# Patient Record
Sex: FEMALE | Race: WHITE | NOT HISPANIC OR LATINO
[De-identification: names, ages, dates, MRNs, and addresses within clinical notes are randomized per-mention and may not be internally consistent; named-entity substitution may affect disease eponyms.]

---

## 2020-01-30 ENCOUNTER — APPOINTMENT (OUTPATIENT)
Dept: ANTEPARTUM | Facility: CLINIC | Age: 30
End: 2020-01-30
Payer: COMMERCIAL

## 2020-01-30 PROCEDURE — 76813 OB US NUCHAL MEAS 1 GEST: CPT

## 2020-01-30 PROCEDURE — 76801 OB US < 14 WKS SINGLE FETUS: CPT

## 2020-03-19 ENCOUNTER — APPOINTMENT (OUTPATIENT)
Dept: ANTEPARTUM | Facility: CLINIC | Age: 30
End: 2020-03-19
Payer: COMMERCIAL

## 2020-03-19 PROCEDURE — 76811 OB US DETAILED SNGL FETUS: CPT

## 2020-03-19 PROCEDURE — 76817 TRANSVAGINAL US OBSTETRIC: CPT

## 2020-07-31 ENCOUNTER — APPOINTMENT (OUTPATIENT)
Dept: ANTEPARTUM | Facility: CLINIC | Age: 30
End: 2020-07-31
Payer: COMMERCIAL

## 2020-07-31 ENCOUNTER — ASOB RESULT (OUTPATIENT)
Age: 30
End: 2020-07-31

## 2020-07-31 ENCOUNTER — APPOINTMENT (OUTPATIENT)
Dept: ANTEPARTUM | Facility: HOSPITAL | Age: 30
End: 2020-07-31

## 2020-07-31 PROBLEM — Z00.00 ENCOUNTER FOR PREVENTIVE HEALTH EXAMINATION: Status: ACTIVE | Noted: 2020-07-31

## 2020-07-31 PROCEDURE — 76819 FETAL BIOPHYS PROFIL W/O NST: CPT

## 2020-07-31 PROCEDURE — 76811 OB US DETAILED SNGL FETUS: CPT

## 2020-07-31 PROCEDURE — 99241 OFFICE CONSULTATION NEW/ESTAB PATIENT 15 MIN: CPT | Mod: 25

## 2020-08-06 ENCOUNTER — OUTPATIENT (OUTPATIENT)
Dept: OUTPATIENT SERVICES | Facility: HOSPITAL | Age: 30
LOS: 1 days | End: 2020-08-06

## 2020-08-06 VITALS
TEMPERATURE: 98 F | OXYGEN SATURATION: 99 % | RESPIRATION RATE: 17 BRPM | WEIGHT: 166.01 LBS | SYSTOLIC BLOOD PRESSURE: 128 MMHG | HEIGHT: 60 IN | DIASTOLIC BLOOD PRESSURE: 86 MMHG | HEART RATE: 90 BPM

## 2020-08-06 DIAGNOSIS — Z01.818 ENCOUNTER FOR OTHER PREPROCEDURAL EXAMINATION: ICD-10-CM

## 2020-08-06 DIAGNOSIS — Z98.890 OTHER SPECIFIED POSTPROCEDURAL STATES: Chronic | ICD-10-CM

## 2020-08-06 LAB
APPEARANCE UR: CLEAR — SIGNIFICANT CHANGE UP
BACTERIA # UR AUTO: HIGH
BILIRUB UR-MCNC: NEGATIVE — SIGNIFICANT CHANGE UP
BLD GP AB SCN SERPL QL: NEGATIVE — SIGNIFICANT CHANGE UP
BLOOD UR QL VISUAL: NEGATIVE — SIGNIFICANT CHANGE UP
COLOR SPEC: SIGNIFICANT CHANGE UP
GLUCOSE UR-MCNC: NEGATIVE — SIGNIFICANT CHANGE UP
HCT VFR BLD CALC: 36.8 % — SIGNIFICANT CHANGE UP (ref 34.5–45)
HGB BLD-MCNC: 12.2 G/DL — SIGNIFICANT CHANGE UP (ref 11.5–15.5)
KETONES UR-MCNC: NEGATIVE — SIGNIFICANT CHANGE UP
LEUKOCYTE ESTERASE UR-ACNC: SIGNIFICANT CHANGE UP
MCHC RBC-ENTMCNC: 29.3 PG — SIGNIFICANT CHANGE UP (ref 27–34)
MCHC RBC-ENTMCNC: 33.2 % — SIGNIFICANT CHANGE UP (ref 32–36)
MCV RBC AUTO: 88.5 FL — SIGNIFICANT CHANGE UP (ref 80–100)
NITRITE UR-MCNC: NEGATIVE — SIGNIFICANT CHANGE UP
NRBC # FLD: 0 K/UL — SIGNIFICANT CHANGE UP (ref 0–0)
PH UR: 6.5 — SIGNIFICANT CHANGE UP (ref 5–8)
PLATELET # BLD AUTO: 218 K/UL — SIGNIFICANT CHANGE UP (ref 150–400)
PMV BLD: 10.5 FL — SIGNIFICANT CHANGE UP (ref 7–13)
PROT UR-MCNC: NEGATIVE — SIGNIFICANT CHANGE UP
RBC # BLD: 4.16 M/UL — SIGNIFICANT CHANGE UP (ref 3.8–5.2)
RBC # FLD: 13.5 % — SIGNIFICANT CHANGE UP (ref 10.3–14.5)
RBC CASTS # UR COMP ASSIST: SIGNIFICANT CHANGE UP (ref 0–?)
RH IG SCN BLD-IMP: POSITIVE — SIGNIFICANT CHANGE UP
SP GR SPEC: 1.01 — SIGNIFICANT CHANGE UP (ref 1–1.04)
SQUAMOUS # UR AUTO: SIGNIFICANT CHANGE UP
UROBILINOGEN FLD QL: NORMAL — SIGNIFICANT CHANGE UP
WBC # BLD: 9.5 K/UL — SIGNIFICANT CHANGE UP (ref 3.8–10.5)
WBC # FLD AUTO: 9.5 K/UL — SIGNIFICANT CHANGE UP (ref 3.8–10.5)
WBC UR QL: HIGH (ref 0–?)

## 2020-08-06 RX ORDER — SODIUM CHLORIDE 9 MG/ML
1000 INJECTION, SOLUTION INTRAVENOUS
Refills: 0 | Status: DISCONTINUED | OUTPATIENT
Start: 2020-08-10 | End: 2020-08-10

## 2020-08-06 NOTE — OB PST NOTE - PROBLEM SELECTOR PLAN 1
Scheduled for primary  section-macrosomia with Dr. Pineda on 08/10/2020.  Preoperative instructions reviewed. Surgical scrub provided. No food after midnight, clear liquids up to 2.5 hours prior to procedure.. Pending labs Type & screen, CBC, UA.   Covid test per GYN

## 2020-08-06 NOTE — OB PST NOTE - HISTORY OF PRESENT ILLNESS
30 yo female with no PMH presents to PST unit with pre-op diagnosis of exceptionally large  baby scheduled for primary  section-macrosomia with Dr. Pineda on 08/10/2020.

## 2020-08-06 NOTE — OB PST NOTE - BP NONINVASIVE DIASTOLIC (MM HG)
What Is The Reason For Today's Visit?: Full Body Skin Examination What Is The Reason For Today's Visit? (Being Monitored For X): concerning skin lesions on an annual basis 86

## 2020-08-07 ENCOUNTER — APPOINTMENT (OUTPATIENT)
Dept: DISASTER EMERGENCY | Facility: CLINIC | Age: 30
End: 2020-08-07

## 2020-08-08 LAB — SARS-COV-2 N GENE NPH QL NAA+PROBE: NOT DETECTED

## 2020-08-09 ENCOUNTER — TRANSCRIPTION ENCOUNTER (OUTPATIENT)
Age: 30
End: 2020-08-09

## 2020-08-10 ENCOUNTER — TRANSCRIPTION ENCOUNTER (OUTPATIENT)
Age: 30
End: 2020-08-10

## 2020-08-10 ENCOUNTER — INPATIENT (INPATIENT)
Facility: HOSPITAL | Age: 30
LOS: 1 days | Discharge: HOME | End: 2020-08-12
Attending: STUDENT IN AN ORGANIZED HEALTH CARE EDUCATION/TRAINING PROGRAM | Admitting: STUDENT IN AN ORGANIZED HEALTH CARE EDUCATION/TRAINING PROGRAM
Payer: COMMERCIAL

## 2020-08-10 VITALS — TEMPERATURE: 98 F

## 2020-08-10 DIAGNOSIS — Z98.890 OTHER SPECIFIED POSTPROCEDURAL STATES: Chronic | ICD-10-CM

## 2020-08-10 LAB
BLD GP AB SCN SERPL QL: NEGATIVE — SIGNIFICANT CHANGE UP
HCT VFR BLD CALC: 36.1 % — SIGNIFICANT CHANGE UP (ref 34.5–45)
HGB BLD-MCNC: 12.4 G/DL — SIGNIFICANT CHANGE UP (ref 11.5–15.5)
MCHC RBC-ENTMCNC: 29.2 PG — SIGNIFICANT CHANGE UP (ref 27–34)
MCHC RBC-ENTMCNC: 34.3 % — SIGNIFICANT CHANGE UP (ref 32–36)
MCV RBC AUTO: 85.1 FL — SIGNIFICANT CHANGE UP (ref 80–100)
NRBC # FLD: 0 K/UL — SIGNIFICANT CHANGE UP (ref 0–0)
PLATELET # BLD AUTO: 190 K/UL — SIGNIFICANT CHANGE UP (ref 150–400)
PMV BLD: 10.1 FL — SIGNIFICANT CHANGE UP (ref 7–13)
RBC # BLD: 4.24 M/UL — SIGNIFICANT CHANGE UP (ref 3.8–5.2)
RBC # FLD: 13.6 % — SIGNIFICANT CHANGE UP (ref 10.3–14.5)
RH IG SCN BLD-IMP: POSITIVE — SIGNIFICANT CHANGE UP
T PALLIDUM AB TITR SER: NEGATIVE — SIGNIFICANT CHANGE UP
WBC # BLD: 9.43 K/UL — SIGNIFICANT CHANGE UP (ref 3.8–10.5)
WBC # FLD AUTO: 9.43 K/UL — SIGNIFICANT CHANGE UP (ref 3.8–10.5)

## 2020-08-10 PROCEDURE — 59025 FETAL NON-STRESS TEST: CPT | Mod: 26

## 2020-08-10 PROCEDURE — 59514 CESAREAN DELIVERY ONLY: CPT | Mod: AS

## 2020-08-10 RX ORDER — ACETAMINOPHEN 500 MG
975 TABLET ORAL
Refills: 0 | Status: DISCONTINUED | OUTPATIENT
Start: 2020-08-10 | End: 2020-08-12

## 2020-08-10 RX ORDER — LANOLIN
1 OINTMENT (GRAM) TOPICAL EVERY 6 HOURS
Refills: 0 | Status: DISCONTINUED | OUTPATIENT
Start: 2020-08-10 | End: 2020-08-12

## 2020-08-10 RX ORDER — MAGNESIUM HYDROXIDE 400 MG/1
30 TABLET, CHEWABLE ORAL
Refills: 0 | Status: DISCONTINUED | OUTPATIENT
Start: 2020-08-10 | End: 2020-08-12

## 2020-08-10 RX ORDER — ONDANSETRON 8 MG/1
4 TABLET, FILM COATED ORAL EVERY 6 HOURS
Refills: 0 | Status: DISCONTINUED | OUTPATIENT
Start: 2020-08-10 | End: 2020-08-11

## 2020-08-10 RX ORDER — SODIUM CHLORIDE 9 MG/ML
1000 INJECTION, SOLUTION INTRAVENOUS
Refills: 0 | Status: DISCONTINUED | OUTPATIENT
Start: 2020-08-10 | End: 2020-08-11

## 2020-08-10 RX ORDER — OXYTOCIN 10 UNIT/ML
333.33 VIAL (ML) INJECTION
Qty: 20 | Refills: 0 | Status: DISCONTINUED | OUTPATIENT
Start: 2020-08-10 | End: 2020-08-11

## 2020-08-10 RX ORDER — OXYCODONE HYDROCHLORIDE 5 MG/1
5 TABLET ORAL
Refills: 0 | Status: DISCONTINUED | OUTPATIENT
Start: 2020-08-10 | End: 2020-08-11

## 2020-08-10 RX ORDER — SODIUM CHLORIDE 9 MG/ML
1000 INJECTION, SOLUTION INTRAVENOUS ONCE
Refills: 0 | Status: COMPLETED | OUTPATIENT
Start: 2020-08-10 | End: 2020-08-10

## 2020-08-10 RX ORDER — OXYCODONE HYDROCHLORIDE 5 MG/1
5 TABLET ORAL ONCE
Refills: 0 | Status: DISCONTINUED | OUTPATIENT
Start: 2020-08-10 | End: 2020-08-12

## 2020-08-10 RX ORDER — TETANUS TOXOID, REDUCED DIPHTHERIA TOXOID AND ACELLULAR PERTUSSIS VACCINE, ADSORBED 5; 2.5; 8; 8; 2.5 [IU]/.5ML; [IU]/.5ML; UG/.5ML; UG/.5ML; UG/.5ML
0.5 SUSPENSION INTRAMUSCULAR ONCE
Refills: 0 | Status: DISCONTINUED | OUTPATIENT
Start: 2020-08-10 | End: 2020-08-12

## 2020-08-10 RX ORDER — HYDROMORPHONE HYDROCHLORIDE 2 MG/ML
1 INJECTION INTRAMUSCULAR; INTRAVENOUS; SUBCUTANEOUS
Refills: 0 | Status: DISCONTINUED | OUTPATIENT
Start: 2020-08-10 | End: 2020-08-11

## 2020-08-10 RX ORDER — FAMOTIDINE 10 MG/ML
20 INJECTION INTRAVENOUS ONCE
Refills: 0 | Status: COMPLETED | OUTPATIENT
Start: 2020-08-10 | End: 2020-08-10

## 2020-08-10 RX ORDER — SIMETHICONE 80 MG/1
80 TABLET, CHEWABLE ORAL EVERY 4 HOURS
Refills: 0 | Status: DISCONTINUED | OUTPATIENT
Start: 2020-08-10 | End: 2020-08-12

## 2020-08-10 RX ORDER — HEPARIN SODIUM 5000 [USP'U]/ML
5000 INJECTION INTRAVENOUS; SUBCUTANEOUS EVERY 12 HOURS
Refills: 0 | Status: DISCONTINUED | OUTPATIENT
Start: 2020-08-10 | End: 2020-08-12

## 2020-08-10 RX ORDER — OXYCODONE HYDROCHLORIDE 5 MG/1
10 TABLET ORAL
Refills: 0 | Status: DISCONTINUED | OUTPATIENT
Start: 2020-08-10 | End: 2020-08-11

## 2020-08-10 RX ORDER — KETOROLAC TROMETHAMINE 30 MG/ML
30 SYRINGE (ML) INJECTION EVERY 6 HOURS
Refills: 0 | Status: DISCONTINUED | OUTPATIENT
Start: 2020-08-10 | End: 2020-08-11

## 2020-08-10 RX ORDER — IBUPROFEN 200 MG
600 TABLET ORAL EVERY 6 HOURS
Refills: 0 | Status: COMPLETED | OUTPATIENT
Start: 2020-08-10 | End: 2021-07-09

## 2020-08-10 RX ORDER — SODIUM CHLORIDE 9 MG/ML
1000 INJECTION, SOLUTION INTRAVENOUS
Refills: 0 | Status: DISCONTINUED | OUTPATIENT
Start: 2020-08-10 | End: 2020-08-10

## 2020-08-10 RX ORDER — OXYCODONE HYDROCHLORIDE 5 MG/1
5 TABLET ORAL
Refills: 0 | Status: DISCONTINUED | OUTPATIENT
Start: 2020-08-10 | End: 2020-08-12

## 2020-08-10 RX ORDER — NALOXONE HYDROCHLORIDE 4 MG/.1ML
0.1 SPRAY NASAL
Refills: 0 | Status: DISCONTINUED | OUTPATIENT
Start: 2020-08-10 | End: 2020-08-11

## 2020-08-10 RX ORDER — CITRIC ACID/SODIUM CITRATE 300-500 MG
30 SOLUTION, ORAL ORAL ONCE
Refills: 0 | Status: COMPLETED | OUTPATIENT
Start: 2020-08-10 | End: 2020-08-10

## 2020-08-10 RX ORDER — DIPHENHYDRAMINE HCL 50 MG
25 CAPSULE ORAL EVERY 6 HOURS
Refills: 0 | Status: DISCONTINUED | OUTPATIENT
Start: 2020-08-10 | End: 2020-08-12

## 2020-08-10 RX ORDER — METOCLOPRAMIDE HCL 10 MG
10 TABLET ORAL ONCE
Refills: 0 | Status: COMPLETED | OUTPATIENT
Start: 2020-08-10 | End: 2020-08-10

## 2020-08-10 RX ORDER — BUTORPHANOL TARTRATE 2 MG/ML
0.12 INJECTION, SOLUTION INTRAMUSCULAR; INTRAVENOUS EVERY 6 HOURS
Refills: 0 | Status: DISCONTINUED | OUTPATIENT
Start: 2020-08-10 | End: 2020-08-11

## 2020-08-10 RX ADMIN — FAMOTIDINE 20 MILLIGRAM(S): 10 INJECTION INTRAVENOUS at 09:31

## 2020-08-10 RX ADMIN — Medication 30 MILLIGRAM(S): at 12:29

## 2020-08-10 RX ADMIN — SODIUM CHLORIDE 125 MILLILITER(S): 9 INJECTION, SOLUTION INTRAVENOUS at 09:31

## 2020-08-10 RX ADMIN — Medication 30 MILLILITER(S): at 09:31

## 2020-08-10 RX ADMIN — Medication 975 MILLIGRAM(S): at 22:18

## 2020-08-10 RX ADMIN — SODIUM CHLORIDE 125 MILLILITER(S): 9 INJECTION, SOLUTION INTRAVENOUS at 08:45

## 2020-08-10 RX ADMIN — Medication 30 MILLIGRAM(S): at 12:28

## 2020-08-10 RX ADMIN — Medication 10 MILLIGRAM(S): at 09:31

## 2020-08-10 RX ADMIN — SODIUM CHLORIDE 2000 MILLILITER(S): 9 INJECTION, SOLUTION INTRAVENOUS at 08:16

## 2020-08-10 RX ADMIN — HEPARIN SODIUM 5000 UNIT(S): 5000 INJECTION INTRAVENOUS; SUBCUTANEOUS at 17:56

## 2020-08-10 RX ADMIN — SODIUM CHLORIDE 75 MILLILITER(S): 9 INJECTION, SOLUTION INTRAVENOUS at 12:28

## 2020-08-10 RX ADMIN — Medication 1000 MILLIUNIT(S)/MIN: at 12:28

## 2020-08-10 RX ADMIN — Medication 975 MILLIGRAM(S): at 21:18

## 2020-08-10 RX ADMIN — Medication 30 MILLIGRAM(S): at 19:00

## 2020-08-10 RX ADMIN — Medication 30 MILLIGRAM(S): at 17:57

## 2020-08-10 NOTE — BRIEF OPERATIVE NOTE - NSICDXBRIEFPREOP_GEN_ALL_CORE_FT
PRE-OP DIAGNOSIS:  Macrosomia affecting management of mother 10-Aug-2020 12:10:29  Melinda Arrington

## 2020-08-10 NOTE — OB PROVIDER H&P - HISTORY OF PRESENT ILLNESS
Subjective  HPI: 29yoF  gestational age 40+1 presents for scheduled pLTCS 2/2 macrosomia with Dr. Pineda. +FM. -LOF. -CTXs. -VB. Pt denies any other concerns.    – PNC: Denies prenatal issues. GBS previously negative, but now .  EFW 4196g by galina.  – OBHx: None  – GynHx: denies  – PMH: denies  – PSH: denies  – Psych: denies   – Social: denies   – Meds: PNV   – Allergies: NKDA  – Will accept blood transfusions? Yes

## 2020-08-10 NOTE — BRIEF OPERATIVE NOTE - OPERATION/FINDINGS
Viable male infant, apgar 9/9, wgt 8.11 #, 3930gms  delivered @1015  cephalic presentation, clear copious fluid  hysterotomy closed in single layer  otherwise grossly nl uterus, tubes & ovaries    QBL: 455  UOP: 100  IVF:   Dictation Number: [id number]

## 2020-08-10 NOTE — DISCHARGE NOTE OB - CARE PLAN
Principal Discharge DX:	Macrosomia  Goal:	Wound care  Assessment and plan of treatment:	Keep the incision clean and dry  Follow up in the office in 2 weeks for wound incision

## 2020-08-10 NOTE — DISCHARGE NOTE OB - CARE PROVIDER_API CALL
Irasema Hernández)  Obstetrics and Gynecology Obstetrics and Gynocology  [address] [place] [address]  Phone: [phone]  Fax: [phone]  Follow Up Time:

## 2020-08-10 NOTE — DISCHARGE NOTE OB - MEDICATION SUMMARY - MEDICATIONS TO TAKE
I will START or STAY ON the medications listed below when I get home from the hospital:  None I will START or STAY ON the medications listed below when I get home from the hospital:    acetaminophen 325 mg oral tablet  -- 3 tab(s) by mouth every 6 hours   -- Indication: For  delivery delivered    ibuprofen 600 mg oral tablet  -- 1 tab(s) by mouth every 6 hours  -- Indication: For  delivery delivered    Prenatal Multivitamins with Folic Acid 1 mg oral tablet  -- 1 tab(s) by mouth once a day  -- Indication: For  delivery delivered

## 2020-08-10 NOTE — DISCHARGE NOTE OB - PATIENT PORTAL LINK FT
You can access the FollowMyHealth Patient Portal offered by [facility] by registering at the following website: [email or web address]. By joining [employer]’s FollowMyHealth portal, you will also be able to view your health information using other applications (apps) compatible with our system.

## 2020-08-10 NOTE — OB PROVIDER DELIVERY SUMMARY - NSPROVIDERDELIVERYNOTE_OBGYN_ALL_OB_FT
Viable male infant, apgar 9/9, wgt 8.11 #, 3930gms  delivered @1015  cephalic presentation/clear copious fluid  hysterotomy closed in single layer  otherwise grossly nl uterus, tubes & ovaries  QBL:455  UOP: 100  IVF:   Dictation Number: Viable male infant, apgar 9/9, wgt 8.11 #, 3930gms  delivered @1015  cephalic presentation/clear copious fluid  hysterotomy closed in double layer closure  otherwise grossly nl uterus, tubes & ovaries  QBL:455  UOP: 100  IVF:   Dictation Number:

## 2020-08-10 NOTE — OB NEONATOLOGY/PEDIATRICIAN DELIVERY SUMMARY - NSPEDSNEONOTESA_OBGYN_ALL_OB_FT
Called to OR for repeat C section/macrosomia. . Mom is  29yoF  gestational age 40+1 presents for scheduled pLTCS 2/2 macrosomia with Dr. Pineda. +FM. -LOF. -CTXs. -VB. Pt denies any other concerns.   PNL acceptable, GBS neg, no ROM, no labor, mom with neg Ob/Gyn Hx. Full term baby , boy, cried immediately, good muscle tone after birth. Delayed cord clamping done for 30 sec. Baby received under the preheated warmer, head positioned, nose,  cleared, baby was dried and basal resuscitation done. HR, resp rate  wnl, good resp effort. Parents informed. APGAR 9/9. PMD:   , breast ,mom wants hep B vaccine. Transferred to nursery for future care

## 2020-08-10 NOTE — BRIEF OPERATIVE NOTE - NSICDXBRIEFPOSTOP_GEN_ALL_CORE_FT
POST-OP DIAGNOSIS:  Macrosomia 10-Aug-2020 12:11:00  Melinda Arrington  S/P  section 10-Aug-2020 12:10:42  Melinda Arrington

## 2020-08-10 NOTE — OB PROVIDER H&P - ASSESSMENT
Assessment  – No prenatal issues. GBS previously negative, now .    Plan  1. Admit to LND for scheduled pLTCS. Routine Labs. IVF.  2. Fetus: cat 1 tracing. VTX. EFW 4196g by sono. No concerns.  4. Prenatal issues: none  5. GBS previously neg, now   6. Pain: IV pain meds    Patient discussed with attending physician, Dr. Samuel.  Danielle Ramirez MD PGY1

## 2020-08-10 NOTE — OB PROVIDER H&P - NSHPPHYSICALEXAM_GEN_ALL_CORE
Objective  BP:   Vital Signs Last 24 Hrs  T(C): 36.8 (10 Aug 2020 08:19), Max: 36.8 (10 Aug 2020 08:03)  T(F): 98.2 (10 Aug 2020 08:19), Max: 98.24 (10 Aug 2020 08:03)  HR: 91 (10 Aug 2020 08:19) (91 - 91)  BP: 122/83 (10 Aug 2020 08:19) (122/83 - 122/83)  RR: 16 (10 Aug 2020 08:19) (16 - 16)    PE:   CV: RRR  Pulm: breathing comfortably on RA  Abd: gravid, nontender  Extr: moving all extremities with ease  – FHT: baseline 1, mod variability, +accels, -decels  – Barton: qmin  – EFW: 4196g by sono  – Sono: vertex

## 2020-08-10 NOTE — DISCHARGE NOTE OB - MATERIALS PROVIDED
Discharge Medication Information for Patients and Families Pocket Guide/Vaccinations/Bottle Feeding Log/Shaken Baby Prevention Handout/Breastfeeding Mother’s Support Group Information/Guide to Postpartum Care/[facility] Hearing Screen Program/Breastfeeding Guide and Packet/Birth Certificate Instructions/[facility]  Screening Program/  Immunization Record/Breastfeeding Log/Back To Sleep Handout

## 2020-08-11 LAB
BASOPHILS # BLD AUTO: 0.02 K/UL — SIGNIFICANT CHANGE UP (ref 0–0.2)
BASOPHILS NFR BLD AUTO: 0.1 % — SIGNIFICANT CHANGE UP (ref 0–2)
EOSINOPHIL # BLD AUTO: 0.03 K/UL — SIGNIFICANT CHANGE UP (ref 0–0.5)
EOSINOPHIL NFR BLD AUTO: 0.2 % — SIGNIFICANT CHANGE UP (ref 0–6)
HCT VFR BLD CALC: 30.1 % — LOW (ref 34.5–45)
HGB BLD-MCNC: 9.8 G/DL — LOW (ref 11.5–15.5)
IMM GRANULOCYTES NFR BLD AUTO: 0.6 % — SIGNIFICANT CHANGE UP (ref 0–1.5)
LYMPHOCYTES # BLD AUTO: 1.96 K/UL — SIGNIFICANT CHANGE UP (ref 1–3.3)
LYMPHOCYTES # BLD AUTO: 14.2 % — SIGNIFICANT CHANGE UP (ref 13–44)
MCHC RBC-ENTMCNC: 29.1 PG — SIGNIFICANT CHANGE UP (ref 27–34)
MCHC RBC-ENTMCNC: 32.6 % — SIGNIFICANT CHANGE UP (ref 32–36)
MCV RBC AUTO: 89.3 FL — SIGNIFICANT CHANGE UP (ref 80–100)
MONOCYTES # BLD AUTO: 0.89 K/UL — SIGNIFICANT CHANGE UP (ref 0–0.9)
MONOCYTES NFR BLD AUTO: 6.4 % — SIGNIFICANT CHANGE UP (ref 2–14)
NEUTROPHILS # BLD AUTO: 10.83 K/UL — HIGH (ref 1.8–7.4)
NEUTROPHILS NFR BLD AUTO: 78.5 % — HIGH (ref 43–77)
NRBC # FLD: 0 K/UL — SIGNIFICANT CHANGE UP (ref 0–0)
PLATELET # BLD AUTO: 168 K/UL — SIGNIFICANT CHANGE UP (ref 150–400)
PMV BLD: 10.4 FL — SIGNIFICANT CHANGE UP (ref 7–13)
RBC # BLD: 3.37 M/UL — LOW (ref 3.8–5.2)
RBC # FLD: 13.7 % — SIGNIFICANT CHANGE UP (ref 10.3–14.5)
WBC # BLD: 13.81 K/UL — HIGH (ref 3.8–10.5)
WBC # FLD AUTO: 13.81 K/UL — HIGH (ref 3.8–10.5)

## 2020-08-11 RX ORDER — FERROUS SULFATE 325(65) MG
325 TABLET ORAL DAILY
Refills: 0 | Status: DISCONTINUED | OUTPATIENT
Start: 2020-08-11 | End: 2020-08-12

## 2020-08-11 RX ORDER — IBUPROFEN 200 MG
1 TABLET ORAL
Qty: 56 | Refills: 0
Start: 2020-08-11 | End: 2020-08-24

## 2020-08-11 RX ORDER — ACETAMINOPHEN 500 MG
3 TABLET ORAL
Qty: 144 | Refills: 0
Start: 2020-08-11 | End: 2020-08-22

## 2020-08-11 RX ORDER — IBUPROFEN 200 MG
600 TABLET ORAL EVERY 6 HOURS
Refills: 0 | Status: DISCONTINUED | OUTPATIENT
Start: 2020-08-11 | End: 2020-08-12

## 2020-08-11 RX ADMIN — Medication 30 MILLIGRAM(S): at 03:00

## 2020-08-11 RX ADMIN — SIMETHICONE 80 MILLIGRAM(S): 80 TABLET, CHEWABLE ORAL at 12:46

## 2020-08-11 RX ADMIN — SIMETHICONE 80 MILLIGRAM(S): 80 TABLET, CHEWABLE ORAL at 06:28

## 2020-08-11 RX ADMIN — Medication 975 MILLIGRAM(S): at 06:28

## 2020-08-11 RX ADMIN — SIMETHICONE 80 MILLIGRAM(S): 80 TABLET, CHEWABLE ORAL at 02:07

## 2020-08-11 RX ADMIN — Medication 600 MILLIGRAM(S): at 23:31

## 2020-08-11 RX ADMIN — Medication 600 MILLIGRAM(S): at 18:50

## 2020-08-11 RX ADMIN — HEPARIN SODIUM 5000 UNIT(S): 5000 INJECTION INTRAVENOUS; SUBCUTANEOUS at 17:50

## 2020-08-11 RX ADMIN — Medication 30 MILLIGRAM(S): at 02:07

## 2020-08-11 RX ADMIN — HEPARIN SODIUM 5000 UNIT(S): 5000 INJECTION INTRAVENOUS; SUBCUTANEOUS at 06:28

## 2020-08-11 RX ADMIN — Medication 600 MILLIGRAM(S): at 17:50

## 2020-08-11 RX ADMIN — Medication 975 MILLIGRAM(S): at 12:46

## 2020-08-11 RX ADMIN — Medication 975 MILLIGRAM(S): at 07:25

## 2020-08-11 RX ADMIN — Medication 325 MILLIGRAM(S): at 12:46

## 2020-08-11 RX ADMIN — Medication 1 TABLET(S): at 12:46

## 2020-08-11 RX ADMIN — Medication 975 MILLIGRAM(S): at 13:45

## 2020-08-11 NOTE — PROGRESS NOTE ADULT - SUBJECTIVE AND OBJECTIVE BOX
OB Progress Note:  Delivery, POD#1    S: 28yo POD#1 s/p LTCS 2/2 macrosomia c/b low UOP postop. This morning, her UOP has improved and she is voiding independently without difficulty. Her pain is well controlled. She is tolerating a regular diet and passing flatus. Denies N/V. Denies CP/SOB/lightheadedness/dizziness.   She is ambulating without difficulty.     O:   Vital Signs Last 24 Hrs  T(C): 36.7 (11 Aug 2020 05:52), Max: 37 (10 Aug 2020 13:30)  T(F): 98.1 (11 Aug 2020 05:52), Max: 98.6 (10 Aug 2020 13:30)  HR: 86 (11 Aug 2020 05:52) (78 - 106)  BP: 118/72 (11 Aug 2020 05:52) (99/72 - 125/73)  BP(mean): 71 (10 Aug 2020 13:30) (60 - 104)  RR: 18 (11 Aug 2020 05:52) (8 - 22)  SpO2: 97% (11 Aug 2020 05:52) (97% - 100%)    I&O's Detail    10 Aug 2020 07:01  -  11 Aug 2020 07:00  --------------------------------------------------------  IN:    Lactated Ringers IV Bolus: 2100 mL    lactated ringers.: 150 mL    Other: 2000 mL    oxytocin Infusion: 375 mL  Total IN: 4625 mL    OUT:    Estimated Blood Loss: 455 mL    Indwelling Catheter - Urethral: 1580 mL    Other: 100 mL    Voided: 1550 mL  Total OUT: 3685 mL    Total NET: 940 mL      Labs:  Blood type: A Positive  Rubella IgG: RPR: Negative                          9.8<L>   13.81<H> >-----------< 168    (  @ 05:30 )             30.1<L>                        12.4   9.43 >-----------< 190    ( 08-10 @ 08:10 )             36.1      PE:  General: NAD  Abdomen: Mildly distended, appropriately tender, incision c/d/i.  Extremities: No erythema, no pitting edema OB Progress Note:  Delivery, POD#1    S: 28yo POD#1 s/p LTCS 2/2 macrosomia complicated by low UOP postop. This morning, her UOP has improved and she is voiding independently without difficulty. Her pain is well controlled. She is tolerating a regular diet and passing flatus. Denies N/V. Denies CP/SOB/lightheadedness/dizziness.   She is ambulating without difficulty.     O:   Vital Signs Last 24 Hrs  T(C): 36.7 (11 Aug 2020 05:52), Max: 37 (10 Aug 2020 13:30)  T(F): 98.1 (11 Aug 2020 05:52), Max: 98.6 (10 Aug 2020 13:30)  HR: 86 (11 Aug 2020 05:52) (78 - 106)  BP: 118/72 (11 Aug 2020 05:52) (99/72 - 125/73)  BP(mean): 71 (10 Aug 2020 13:30) (60 - 104)  RR: 18 (11 Aug 2020 05:52) (8 - 22)  SpO2: 97% (11 Aug 2020 05:52) (97% - 100%)    I&O's Detail    10 Aug 2020 07:01  -  11 Aug 2020 07:00  --------------------------------------------------------  IN:    Lactated Ringers IV Bolus: 2100 mL    lactated ringers.: 150 mL    Other: 2000 mL    oxytocin Infusion: 375 mL  Total IN: 4625 mL    OUT:    Estimated Blood Loss: 455 mL    Indwelling Catheter - Urethral: 1580 mL    Other: 100 mL    Voided: 1550 mL  Total OUT: 3685 mL    Total NET: 940 mL      Labs:  Blood type: A Positive  Rubella IgG: RPR: Negative                          9.8<L>   13.81<H> >-----------< 168    (  @ 05:30 )             30.1<L>                        12.4   9.43 >-----------< 190    ( 08-10 @ 08:10 )             36.1      PE:  General: NAD  Abdomen: Mildly distended, appropriately tender, incision c/d/i.  Extremities: No erythema, no pitting edema

## 2020-08-11 NOTE — PROGRESS NOTE ADULT - PROBLEM SELECTOR PLAN 1
- UOP now adequate  - Continue regular diet.  - Increase ambulation.  - Continue motrin, tylenol, oxycodone PRN for pain control.   - F/u AM CBC    Danielle Ramirez MD PGY1 - UOP now adequate  - Discontinue schwab  - Continue regular diet.  - Increase ambulation.  - Out of bed with ambulation  - Incentive spirometer  - Continue motrin, tylenol, oxycodone PRN for pain control.   - Labs reviewed     Danielle Ramirez MD PGY1

## 2020-08-12 VITALS
TEMPERATURE: 98 F | DIASTOLIC BLOOD PRESSURE: 76 MMHG | SYSTOLIC BLOOD PRESSURE: 138 MMHG | RESPIRATION RATE: 18 BRPM | HEART RATE: 90 BPM | OXYGEN SATURATION: 99 %

## 2020-08-12 RX ADMIN — Medication 600 MILLIGRAM(S): at 00:30

## 2020-08-12 RX ADMIN — Medication 1 TABLET(S): at 11:55

## 2020-08-12 RX ADMIN — Medication 600 MILLIGRAM(S): at 11:55

## 2020-08-12 RX ADMIN — Medication 325 MILLIGRAM(S): at 11:55

## 2020-08-12 RX ADMIN — Medication 600 MILLIGRAM(S): at 06:21

## 2020-08-12 RX ADMIN — HEPARIN SODIUM 5000 UNIT(S): 5000 INJECTION INTRAVENOUS; SUBCUTANEOUS at 06:21

## 2020-08-12 NOTE — PROGRESS NOTE ADULT - ASSESSMENT
Assessment and Plan  POD #2 s/p C/S.  Doing well and bonding with baby  Encourage ambulation.  Incisional care and PO instructions reviewed.  CPC.  discharge pt home today  RTO 2 weeks for incision check/PRN

## 2020-08-12 NOTE — PROGRESS NOTE ADULT - SUBJECTIVE AND OBJECTIVE BOX
Post-Operative Note, C/S  She is a  29y woman who is now post-operative day: 2    Subjective:  The patient feels well.  She is ambulating.   She is tolerating regular diet.  She denies nausea and vomiting; denies fever.  She is voiding.  Her pain is controlled; incisional pain is appropriate.  She reports normal postpartum bleeding.  She is breastfeeding.    Physical exam:    Vital Signs Last 24 Hrs  T(C): 36.8 (12 Aug 2020 05:41), Max: 36.9 (11 Aug 2020 15:30)  T(F): 98.2 (12 Aug 2020 05:41), Max: 98.5 (11 Aug 2020 15:30)  HR: 81 (12 Aug 2020 05:41) (81 - 97)  BP: 123/73 (12 Aug 2020 05:41) (114/73 - 123/73)  BP(mean): --  RR: 18 (12 Aug 2020 05:41) (16 - 18)  SpO2: 99% (12 Aug 2020 05:41) (99% - 100%)    Gen: NAD  Breast: Soft, nontender, not engorged.  Abdomen: Soft, nontender, no distension , firm uterine fundus at umbilicus.  Incision: C/D/I.  Pelvic: Normal lochia noted  Ext: No calf tenderness    LABS:                        9.8    13.81 )-----------( 168      ( 11 Aug 2020 05:30 )             30.1       Allergies    No Known Allergies        MEDICATIONS  (STANDING):  acetaminophen   Tablet .. 975 milliGRAM(s) Oral <User Schedule>  diphtheria/tetanus/pertussis (acellular) Vaccine (ADAcel) 0.5 milliLiter(s) IntraMuscular once  ferrous    sulfate 325 milliGRAM(s) Oral daily  heparin   Injectable 5000 Unit(s) SubCutaneous every 12 hours  ibuprofen  Tablet. 600 milliGRAM(s) Oral every 6 hours  prenatal multivitamin 1 Tablet(s) Oral daily    MEDICATIONS  (PRN):  diphenhydrAMINE 25 milliGRAM(s) Oral every 6 hours PRN Itching  lanolin Ointment 1 Application(s) Topical every 6 hours PRN Sore Nipples  magnesium hydroxide Suspension 30 milliLiter(s) Oral two times a day PRN Constipation  oxyCODONE    IR 5 milliGRAM(s) Oral every 3 hours PRN Moderate to Severe Pain (4-10)  oxyCODONE    IR 5 milliGRAM(s) Oral once PRN Moderate to Severe Pain (4-10)  simethicone 80 milliGRAM(s) Chew every 4 hours PRN Gas

## 2021-08-01 NOTE — OB PST NOTE - NS SC CAGE ALCOHOL EYE OPENER
no Pt caught 3rd right finger in door last week, was seen in ED and sent home. As per mother, pt's had worsening swelling, drainage from finger, was seen by PCP and given antibiotics 2 days ago, unable to recall name of antibiotics. Pt's mother reports pt had fever starting today, given motrin 1 hour PTA.

## 2021-11-04 NOTE — OB PST NOTE - NSHPPHYSICALEXAM_GEN_ALL_CORE

## 2022-01-08 ENCOUNTER — TRANSCRIPTION ENCOUNTER (OUTPATIENT)
Age: 32
End: 2022-01-08

## 2022-02-25 ENCOUNTER — APPOINTMENT (OUTPATIENT)
Dept: ANTEPARTUM | Facility: CLINIC | Age: 32
End: 2022-02-25
Payer: COMMERCIAL

## 2022-02-25 ENCOUNTER — ASOB RESULT (OUTPATIENT)
Age: 32
End: 2022-02-25

## 2022-02-25 PROCEDURE — 76813 OB US NUCHAL MEAS 1 GEST: CPT

## 2022-02-25 PROCEDURE — 76801 OB US < 14 WKS SINGLE FETUS: CPT

## 2022-04-16 ENCOUNTER — APPOINTMENT (OUTPATIENT)
Dept: ANTEPARTUM | Facility: CLINIC | Age: 32
End: 2022-04-16
Payer: COMMERCIAL

## 2022-04-16 ENCOUNTER — ASOB RESULT (OUTPATIENT)
Age: 32
End: 2022-04-16

## 2022-04-16 PROCEDURE — 76805 OB US >/= 14 WKS SNGL FETUS: CPT

## 2022-08-29 ENCOUNTER — OUTPATIENT (OUTPATIENT)
Dept: OUTPATIENT SERVICES | Facility: HOSPITAL | Age: 32
LOS: 1 days | End: 2022-08-29

## 2022-08-29 VITALS — WEIGHT: 293 LBS | HEIGHT: 60 IN

## 2022-08-29 DIAGNOSIS — Z98.891 HISTORY OF UTERINE SCAR FROM PREVIOUS SURGERY: ICD-10-CM

## 2022-08-29 DIAGNOSIS — Z98.890 OTHER SPECIFIED POSTPROCEDURAL STATES: Chronic | ICD-10-CM

## 2022-08-29 DIAGNOSIS — Z98.891 HISTORY OF UTERINE SCAR FROM PREVIOUS SURGERY: Chronic | ICD-10-CM

## 2022-08-29 LAB
APPEARANCE UR: ABNORMAL
BILIRUB UR-MCNC: NEGATIVE — SIGNIFICANT CHANGE UP
BLD GP AB SCN SERPL QL: NEGATIVE — SIGNIFICANT CHANGE UP
COLOR SPEC: SIGNIFICANT CHANGE UP
DIFF PNL FLD: NEGATIVE — SIGNIFICANT CHANGE UP
GLUCOSE UR QL: NEGATIVE — SIGNIFICANT CHANGE UP
HCT VFR BLD CALC: 36 % — SIGNIFICANT CHANGE UP (ref 34.5–45)
HGB BLD-MCNC: 12.1 G/DL — SIGNIFICANT CHANGE UP (ref 11.5–15.5)
KETONES UR-MCNC: ABNORMAL
LEUKOCYTE ESTERASE UR-ACNC: ABNORMAL
MCHC RBC-ENTMCNC: 29 PG — SIGNIFICANT CHANGE UP (ref 27–34)
MCHC RBC-ENTMCNC: 33.6 GM/DL — SIGNIFICANT CHANGE UP (ref 32–36)
MCV RBC AUTO: 86.3 FL — SIGNIFICANT CHANGE UP (ref 80–100)
NITRITE UR-MCNC: NEGATIVE — SIGNIFICANT CHANGE UP
NRBC # BLD: 0 /100 WBCS — SIGNIFICANT CHANGE UP (ref 0–0)
NRBC # FLD: 0 K/UL — SIGNIFICANT CHANGE UP (ref 0–0)
PH UR: 6.5 — SIGNIFICANT CHANGE UP (ref 5–8)
PLATELET # BLD AUTO: 232 K/UL — SIGNIFICANT CHANGE UP (ref 150–400)
PROT UR-MCNC: ABNORMAL
RBC # BLD: 4.17 M/UL — SIGNIFICANT CHANGE UP (ref 3.8–5.2)
RBC # FLD: 14.6 % — HIGH (ref 10.3–14.5)
RH IG SCN BLD-IMP: POSITIVE — SIGNIFICANT CHANGE UP
SP GR SPEC: 1.01 — SIGNIFICANT CHANGE UP (ref 1.01–1.05)
UROBILINOGEN FLD QL: SIGNIFICANT CHANGE UP
WBC # BLD: 13.06 K/UL — HIGH (ref 3.8–10.5)
WBC # FLD AUTO: 13.06 K/UL — HIGH (ref 3.8–10.5)

## 2022-08-29 RX ORDER — FERROUS SULFATE 325(65) MG
1 TABLET ORAL
Qty: 0 | Refills: 0 | DISCHARGE

## 2022-08-29 NOTE — OB PST NOTE - PROBLEM SELECTOR PLAN 1
32 y/o female  delivered by  section due to large size of baby (BW=9lbs).  Pt now with EDC 22, Scheduled for  Repeat  section.     CBC T&S UA    Preop instructions and antibacterial soap given and explained (verbal and written), with teach back. Preop covid testing protocol reviewed with pt

## 2022-08-29 NOTE — OB PST NOTE - HISTORY OF PRESENT ILLNESS
30 y/o female  delivered by cesaeran section due to large size of baby (BW=9lbs).  Pt now with EDC 22, Scheduled for  Repeat  section.     Pt reporters she feels baby moving today as usual 32 y/o female  delivered by  section due to large size of baby (BW=9lbs).  Pt now with EDC 22, Scheduled for  Repeat  section.     Pt reporters she feels baby moving today as usual

## 2022-08-29 NOTE — OB PST NOTE - ASSESSMENT
32 y/o female  delivered by  section due to large size of baby (BW=9lbs).  Pt now with EDC 22, Scheduled for  Repeat  section.

## 2022-09-09 ENCOUNTER — OUTPATIENT (OUTPATIENT)
Dept: OUTPATIENT SERVICES | Facility: HOSPITAL | Age: 32
LOS: 1 days | Discharge: HOME | End: 2022-09-09

## 2022-09-09 VITALS
DIASTOLIC BLOOD PRESSURE: 73 MMHG | RESPIRATION RATE: 16 BRPM | SYSTOLIC BLOOD PRESSURE: 118 MMHG | TEMPERATURE: 98 F | HEART RATE: 86 BPM

## 2022-09-09 DIAGNOSIS — O26.899 OTHER SPECIFIED PREGNANCY RELATED CONDITIONS, UNSPECIFIED TRIMESTER: ICD-10-CM

## 2022-09-09 DIAGNOSIS — Z98.891 HISTORY OF UTERINE SCAR FROM PREVIOUS SURGERY: Chronic | ICD-10-CM

## 2022-09-09 DIAGNOSIS — Z3A.00 WEEKS OF GESTATION OF PREGNANCY NOT SPECIFIED: ICD-10-CM

## 2022-09-09 DIAGNOSIS — Z98.890 OTHER SPECIFIED POSTPROCEDURAL STATES: Chronic | ICD-10-CM

## 2022-09-09 PROCEDURE — 99214 OFFICE O/P EST MOD 30 MIN: CPT | Mod: 25

## 2022-09-09 PROCEDURE — 76818 FETAL BIOPHYS PROFILE W/NST: CPT | Mod: 26

## 2022-09-09 NOTE — OB PROVIDER TRIAGE NOTE - NSOBPROVIDERNOTE_OBGYN_ALL_OB_FT
a/p: 31 y.o.  UMBERTO 2022 @ 40.1 weeks r/o ROM    no evidence of ROM at this time    BPP 10/10 a/p: 31 y.o.  UMBERTO 2022 @ 40.1 weeks r/o ROM    no evidence of ROM at this time    BPP 10/10    1635  Dr Samuel called re: plan of care      Discussed with Dr Samuel. Plan for discharge home with labor precautions/fetal kick counts reviewed. Encouraged increased PO hydration. F/U next scheduled prenatal appointment.    Shannon, NP

## 2022-09-09 NOTE — OB PROVIDER TRIAGE NOTE - NSHPPHYSICALEXAM_GEN_ALL_CORE
abdomen soft, nontender  taus: sliup, cephalic presentation, posterior placenta, bpp 8/8, julian 8.12  sse: negative pooling/nitrazine/ferning  sve: 0/0/-3/I  nst reactive  toco: no ctx noted

## 2022-09-09 NOTE — OB PROVIDER TRIAGE NOTE - HISTORY OF PRESENT ILLNESS
31 y.o.  UMBERTO 2022 @ 40.1 weeks presents with c/o LOF since 330am and irregular ctx. Denies VB. States +FM and uncomplicated antepartum course. Pt desires TOLAC. Pt is scheduled for repeat cs on 2022.    allergies:  NKDA  medications:  prenatal vitamins    med/surg hx:  cs x 1    OBGYN hx:  8/10/2020 scheduled pLTCS 2/2 suspected fetal macrosomia - 8lbs 12 oz

## 2022-09-11 ENCOUNTER — TRANSCRIPTION ENCOUNTER (OUTPATIENT)
Age: 32
End: 2022-09-11

## 2022-09-12 ENCOUNTER — INPATIENT (INPATIENT)
Facility: HOSPITAL | Age: 32
LOS: 1 days | Discharge: HOME | End: 2022-09-14
Attending: OBSTETRICS & GYNECOLOGY | Admitting: OBSTETRICS & GYNECOLOGY

## 2022-09-12 ENCOUNTER — TRANSCRIPTION ENCOUNTER (OUTPATIENT)
Age: 32
End: 2022-09-12

## 2022-09-12 VITALS
RESPIRATION RATE: 16 BRPM | DIASTOLIC BLOOD PRESSURE: 77 MMHG | TEMPERATURE: 99 F | SYSTOLIC BLOOD PRESSURE: 127 MMHG | HEART RATE: 82 BPM

## 2022-09-12 DIAGNOSIS — Z98.891 HISTORY OF UTERINE SCAR FROM PREVIOUS SURGERY: Chronic | ICD-10-CM

## 2022-09-12 DIAGNOSIS — Z98.890 OTHER SPECIFIED POSTPROCEDURAL STATES: Chronic | ICD-10-CM

## 2022-09-12 DIAGNOSIS — O26.899 OTHER SPECIFIED PREGNANCY RELATED CONDITIONS, UNSPECIFIED TRIMESTER: ICD-10-CM

## 2022-09-12 DIAGNOSIS — Z3A.00 WEEKS OF GESTATION OF PREGNANCY NOT SPECIFIED: ICD-10-CM

## 2022-09-12 LAB
AMNISURE ROM (RUPTURE OF MEMBRANES): POSITIVE
B PERT DNA SPEC QL NAA+PROBE: SIGNIFICANT CHANGE UP
B PERT+PARAPERT DNA PNL SPEC NAA+PROBE: SIGNIFICANT CHANGE UP
BASOPHILS # BLD AUTO: 0.04 K/UL — SIGNIFICANT CHANGE UP (ref 0–0.2)
BASOPHILS NFR BLD AUTO: 0.3 % — SIGNIFICANT CHANGE UP (ref 0–2)
BLD GP AB SCN SERPL QL: NEGATIVE — SIGNIFICANT CHANGE UP
BORDETELLA PARAPERTUSSIS (RAPRVP): SIGNIFICANT CHANGE UP
C PNEUM DNA SPEC QL NAA+PROBE: SIGNIFICANT CHANGE UP
EOSINOPHIL # BLD AUTO: 0.02 K/UL — SIGNIFICANT CHANGE UP (ref 0–0.5)
EOSINOPHIL NFR BLD AUTO: 0.1 % — SIGNIFICANT CHANGE UP (ref 0–6)
FLUAV SUBTYP SPEC NAA+PROBE: SIGNIFICANT CHANGE UP
FLUBV RNA SPEC QL NAA+PROBE: SIGNIFICANT CHANGE UP
HADV DNA SPEC QL NAA+PROBE: SIGNIFICANT CHANGE UP
HCOV 229E RNA SPEC QL NAA+PROBE: SIGNIFICANT CHANGE UP
HCOV HKU1 RNA SPEC QL NAA+PROBE: SIGNIFICANT CHANGE UP
HCOV NL63 RNA SPEC QL NAA+PROBE: SIGNIFICANT CHANGE UP
HCOV OC43 RNA SPEC QL NAA+PROBE: SIGNIFICANT CHANGE UP
HCT VFR BLD CALC: 40.1 % — SIGNIFICANT CHANGE UP (ref 34.5–45)
HGB BLD-MCNC: 13.9 G/DL — SIGNIFICANT CHANGE UP (ref 11.5–15.5)
HMPV RNA SPEC QL NAA+PROBE: SIGNIFICANT CHANGE UP
HPIV1 RNA SPEC QL NAA+PROBE: SIGNIFICANT CHANGE UP
HPIV2 RNA SPEC QL NAA+PROBE: SIGNIFICANT CHANGE UP
HPIV3 RNA SPEC QL NAA+PROBE: SIGNIFICANT CHANGE UP
HPIV4 RNA SPEC QL NAA+PROBE: SIGNIFICANT CHANGE UP
IANC: 10.92 K/UL — HIGH (ref 1.8–7.4)
IMM GRANULOCYTES NFR BLD AUTO: 0.7 % — SIGNIFICANT CHANGE UP (ref 0–1.5)
LYMPHOCYTES # BLD AUTO: 14.7 % — SIGNIFICANT CHANGE UP (ref 13–44)
LYMPHOCYTES # BLD AUTO: 2.02 K/UL — SIGNIFICANT CHANGE UP (ref 1–3.3)
M PNEUMO DNA SPEC QL NAA+PROBE: SIGNIFICANT CHANGE UP
MCHC RBC-ENTMCNC: 29.6 PG — SIGNIFICANT CHANGE UP (ref 27–34)
MCHC RBC-ENTMCNC: 34.7 GM/DL — SIGNIFICANT CHANGE UP (ref 32–36)
MCV RBC AUTO: 85.3 FL — SIGNIFICANT CHANGE UP (ref 80–100)
MONOCYTES # BLD AUTO: 0.65 K/UL — SIGNIFICANT CHANGE UP (ref 0–0.9)
MONOCYTES NFR BLD AUTO: 4.7 % — SIGNIFICANT CHANGE UP (ref 2–14)
NEUTROPHILS # BLD AUTO: 10.92 K/UL — HIGH (ref 1.8–7.4)
NEUTROPHILS NFR BLD AUTO: 79.5 % — HIGH (ref 43–77)
NRBC # BLD: 0 /100 WBCS — SIGNIFICANT CHANGE UP (ref 0–0)
NRBC # FLD: 0 K/UL — SIGNIFICANT CHANGE UP (ref 0–0)
PLATELET # BLD AUTO: 206 K/UL — SIGNIFICANT CHANGE UP (ref 150–400)
RAPID RVP RESULT: SIGNIFICANT CHANGE UP
RBC # BLD: 4.7 M/UL — SIGNIFICANT CHANGE UP (ref 3.8–5.2)
RBC # FLD: 14.5 % — SIGNIFICANT CHANGE UP (ref 10.3–14.5)
RH IG SCN BLD-IMP: POSITIVE — SIGNIFICANT CHANGE UP
RSV RNA SPEC QL NAA+PROBE: SIGNIFICANT CHANGE UP
RV+EV RNA SPEC QL NAA+PROBE: SIGNIFICANT CHANGE UP
SARS-COV-2 RNA SPEC QL NAA+PROBE: SIGNIFICANT CHANGE UP
WBC # BLD: 13.74 K/UL — HIGH (ref 3.8–10.5)
WBC # FLD AUTO: 13.74 K/UL — HIGH (ref 3.8–10.5)

## 2022-09-12 RX ORDER — OXYTOCIN 10 UNIT/ML
VIAL (ML) INJECTION
Qty: 20 | Refills: 0 | Status: DISCONTINUED | OUTPATIENT
Start: 2022-09-12 | End: 2022-09-12

## 2022-09-12 RX ORDER — LANOLIN
1 OINTMENT (GRAM) TOPICAL EVERY 6 HOURS
Refills: 0 | Status: DISCONTINUED | OUTPATIENT
Start: 2022-09-12 | End: 2022-09-14

## 2022-09-12 RX ORDER — IBUPROFEN 200 MG
1 TABLET ORAL
Qty: 0 | Refills: 0 | DISCHARGE
Start: 2022-09-12

## 2022-09-12 RX ORDER — TETANUS TOXOID, REDUCED DIPHTHERIA TOXOID AND ACELLULAR PERTUSSIS VACCINE, ADSORBED 5; 2.5; 8; 8; 2.5 [IU]/.5ML; [IU]/.5ML; UG/.5ML; UG/.5ML; UG/.5ML
0.5 SUSPENSION INTRAMUSCULAR ONCE
Refills: 0 | Status: DISCONTINUED | OUTPATIENT
Start: 2022-09-12 | End: 2022-09-14

## 2022-09-12 RX ORDER — IBUPROFEN 200 MG
600 TABLET ORAL EVERY 6 HOURS
Refills: 0 | Status: COMPLETED | OUTPATIENT
Start: 2022-09-12 | End: 2023-08-11

## 2022-09-12 RX ORDER — FAMOTIDINE 10 MG/ML
20 INJECTION INTRAVENOUS ONCE
Refills: 0 | Status: DISCONTINUED | OUTPATIENT
Start: 2022-09-12 | End: 2022-09-12

## 2022-09-12 RX ORDER — ACETAMINOPHEN 500 MG
975 TABLET ORAL
Refills: 0 | Status: DISCONTINUED | OUTPATIENT
Start: 2022-09-12 | End: 2022-09-14

## 2022-09-12 RX ORDER — SODIUM CHLORIDE 9 MG/ML
1000 INJECTION, SOLUTION INTRAVENOUS ONCE
Refills: 0 | Status: DISCONTINUED | OUTPATIENT
Start: 2022-09-12 | End: 2022-09-12

## 2022-09-12 RX ORDER — DIPHENHYDRAMINE HCL 50 MG
25 CAPSULE ORAL EVERY 6 HOURS
Refills: 0 | Status: DISCONTINUED | OUTPATIENT
Start: 2022-09-12 | End: 2022-09-14

## 2022-09-12 RX ORDER — SODIUM CHLORIDE 9 MG/ML
1000 INJECTION, SOLUTION INTRAVENOUS
Refills: 0 | Status: DISCONTINUED | OUTPATIENT
Start: 2022-09-12 | End: 2022-09-12

## 2022-09-12 RX ORDER — OXYCODONE HYDROCHLORIDE 5 MG/1
5 TABLET ORAL ONCE
Refills: 0 | Status: DISCONTINUED | OUTPATIENT
Start: 2022-09-12 | End: 2022-09-14

## 2022-09-12 RX ORDER — OXYCODONE HYDROCHLORIDE 5 MG/1
5 TABLET ORAL
Refills: 0 | Status: DISCONTINUED | OUTPATIENT
Start: 2022-09-12 | End: 2022-09-14

## 2022-09-12 RX ORDER — CITRIC ACID/SODIUM CITRATE 300-500 MG
30 SOLUTION, ORAL ORAL ONCE
Refills: 0 | Status: DISCONTINUED | OUTPATIENT
Start: 2022-09-12 | End: 2022-09-12

## 2022-09-12 RX ORDER — SIMETHICONE 80 MG/1
80 TABLET, CHEWABLE ORAL EVERY 4 HOURS
Refills: 0 | Status: DISCONTINUED | OUTPATIENT
Start: 2022-09-12 | End: 2022-09-14

## 2022-09-12 RX ORDER — MAGNESIUM HYDROXIDE 400 MG/1
30 TABLET, CHEWABLE ORAL
Refills: 0 | Status: DISCONTINUED | OUTPATIENT
Start: 2022-09-12 | End: 2022-09-14

## 2022-09-12 RX ORDER — OXYTOCIN 10 UNIT/ML
333.33 VIAL (ML) INJECTION
Qty: 20 | Refills: 0 | Status: DISCONTINUED | OUTPATIENT
Start: 2022-09-12 | End: 2022-09-13

## 2022-09-12 RX ORDER — LANOLIN
1 OINTMENT (GRAM) TOPICAL
Qty: 0 | Refills: 0 | DISCHARGE
Start: 2022-09-12

## 2022-09-12 RX ORDER — SODIUM CHLORIDE 9 MG/ML
1000 INJECTION, SOLUTION INTRAVENOUS
Refills: 0 | Status: DISCONTINUED | OUTPATIENT
Start: 2022-09-12 | End: 2022-09-13

## 2022-09-12 NOTE — DISCHARGE NOTE OB - PATIENT PORTAL LINK FT
You can access the FollowMyHealth Patient Portal offered by [facility] by registering at the following website: [email or web address]. By joining [employer]’s FollowMyHealth portal, you will also be able to view your health information using other applications (apps) compatible with our system.

## 2022-09-12 NOTE — DISCHARGE NOTE OB - CARE PROVIDER_API CALL
oMy Martins)  Obstetrics and Gynecology  [address] [place] [address]  Phone: [phone]  Fax: [phone]  Follow Up Time: 2 weeks

## 2022-09-12 NOTE — OB RN DELIVERY SUMMARY - NSSELHIDDEN_OBGYN_ALL_OB_FT
[NS_DeliveryAttending1_OBGYN_ALL_OB_FT:[email or web address]=],[NS_DeliveryRN_OBGYN_ALL_OB_FT:[id number]=]

## 2022-09-12 NOTE — OB PROVIDER H&P - NSHPPHYSICALEXAM_GEN_ALL_CORE
Vital Signs Last 24 Hrs  T(C): 37.0 (12 Sep 2022 16:01), Max: 37 (12 Sep 2022 15:45)  T(F): 98.6 (12 Sep 2022 16:01), Max: 98.6 (12 Sep 2022 15:45)  HR: 77 (12 Sep 2022 17:25) (77 - 82)  BP: 119/71 (12 Sep 2022 17:25) (118/73 - 127/77)  BP(mean): --  RR: 16 (12 Sep 2022 15:45) (16 - 16)  SpO2: --    A&o x3  CTAB  abdomen: gravid, soft, nontender  TAS: vtx, fundal placenta, bpp 8/8, julian 6.43  SSE: cervix appears closed, moderate amount of milky watery non odorous fluid in vault, Nitrazine equivocal, fern negative  amnisure sent  sve 0.5/20/-3  NST: 135 baseline, moderate variability + accels, no decels, no contractions

## 2022-09-12 NOTE — DISCHARGE NOTE OB - MEDICATION SUMMARY - MEDICATIONS TO TAKE
I will START or STAY ON the medications listed below when I get home from the hospital:    ibuprofen 600 mg oral tablet  -- 1 tab(s) by mouth every 6 hours  -- Indication: For  section     lanolin topical ointment  -- 1 application on skin every 6 hours, As needed, Sore Nipples  -- Indication: For  section     ferrous sulfate 325 mg (65 mg elemental iron) oral tablet  -- 1 tab(s) by mouth once a day  -- Indication: For  section     Multiple Vitamins oral tablet  -- 1 tab(s) by mouth once a day. last dsoe 1 week prior to surgery  -- Indication: For  section    I will START or STAY ON the medications listed below when I get home from the hospital:    Tylenol 325 mg oral tablet  -- 3 tab(s) by mouth every 6 hours, As Needed -for mild pain   -- Indication: For pain    ibuprofen 600 mg oral tablet  -- 1 tab(s) by mouth every 6 hours, As Needed  -- Indication: For pain    lanolin topical ointment  -- 1 application on skin every 6 hours, As needed, Sore Nipples  -- Indication: For  section     Prenatal Multivitamins with Folic Acid 1 mg oral tablet  -- 1 tab(s) by mouth once a day  -- Indication: For Single delivery by  section    ferrous sulfate 325 mg (65 mg elemental iron) oral tablet  -- 1 tab(s) by mouth once a day  -- Indication: For  section     Multiple Vitamins oral tablet  -- 1 tab(s) by mouth once a day. last dsoe 1 week prior to surgery  -- Indication: For  section

## 2022-09-12 NOTE — OB RN INTRAOPERATIVE NOTE - NSSELHIDDEN_OBGYN_ALL_OB_FT
[NS_DeliveryAttending1_OBGYN_ALL_OB_FT:[email or web address]=],[NS_DeliveryRN_OBGYN_ALL_OB_FT:[id number]=]

## 2022-09-12 NOTE — OB PROVIDER DELIVERY SUMMARY - NSSELHIDDEN_OBGYN_ALL_OB_FT
[NS_DeliveryAttending1_OBGYN_ALL_OB_FT:[email or web address]=],[NS_DeliveryRN_OBGYN_ALL_OB_FT:[id number]=]

## 2022-09-12 NOTE — OB RN DELIVERY SUMMARY - NS_SEPSISRSKCALC_OBGYN_ALL_OB_FT
EOS calculated successfully. EOS Risk Factor: 0.5/1000 live births ([facility] national incidence); GA=40w4d; Temp=98.6; ROM=89.483; GBS='Negative'; Antibiotics='No antibiotics or any antibiotics < 2 hrs prior to birth'

## 2022-09-12 NOTE — OB PROVIDER DELIVERY SUMMARY - NSPROVIDERDELIVERYNOTE_OBGYN_ALL_OB_FT
Repeat  Section , declined TOLAC  Viable male APGAR 9/9   Single layer hysterotomy closure   normal ovaries and fallopian tubes   TXA x 1 , extra 20 units of pitocin  Removal of surgical scar   hemostasis visualized   qbl 383 cc

## 2022-09-12 NOTE — OB RN TRIAGE NOTE - CHIEF COMPLAINT QUOTE
amnisure neg 9/9  pt states she was called by MD today and was told a test they did in the office on 9/10 was positive for srom

## 2022-09-12 NOTE — OB PROVIDER H&P - HISTORY OF PRESENT ILLNESS
This is a 31 year old  at 40.4 weeks gestational age presents from [facility] with  questionable + FFN that was sent in office on satursday 9/10. Pt was in gaudencio nd evaluated in D&T on  or leaking of fluid and was found to have intact membranes with an VANE of 8.5. PT went to [facility] for follow up NST on 9/10 and reports that a swab was sent to check the fluid, vane at that time ws found to be 9 as per patient. Today pt reports continued leaking of watery milky discharge and was called by the office that the FFN done on 9/10 was positive.   Pt reports +GFM, reports continued leaking of milky watery discharge, denies VB or contractions  Pt scheduled for rpt c/s on , iititally desired TOLAC, however today shes she wants a c/s  GBS neg     AP course uncomplicated as per patient  most recent efw 8#9 today     med: anemia   surg: c/s 202  gyn: denies  OB: 8/10/2020 sched pLTCS 2/2 suspected macrosomia 8#12   current meds: pnv, iron   NKDA

## 2022-09-12 NOTE — OB PROVIDER TRIAGE NOTE - NSOBPROVIDERNOTE_OBGYN_ALL_OB_FT
amnisure sent amnisure sent    1800  Amnisure positive    Plan discussed with Dr Topete, Dr Sandesr, Dr Gray anesthesia, AN ken  Pt NPO at 1930  RVP sent stat to lab for expedited results  d/w Dr Sanders regarding prolong rupture, will give Azithromycin in OR  routine preop orders

## 2022-09-12 NOTE — DISCHARGE NOTE OB - MOOD SWINGS / DEPRESSION OR CRYING SPELLS LASTING MORE THAN 3 DAYS
0715-Bedside and Verbal shift change report given to Bere Orosco , PADDY and Mikayla Nolan RN  (oncoming nurse) by Amna Marr RN  (offgoing nurse). Report included the following information SBAR, Kardex, Intake/Output and Cardiac Rhythm SR.  
0800- Assessment completed. Responds to pain. PEERLA. Does not follow commands. 1000- Pt. Repositioned. Mouth care perfomed. 1200- Reassessment completed. No change to previous assessment. 1600- Reassessment performed. Responds to pain stimulation. Family at bedside. 1650- Fentanyl 50 mcg given for bladder irrigation. Patient appears in distress from bladder irrigation. Received order from Dr. Suzy Caruso to administer an additional fentanyl 50 mcg. 65- Dr. Ruta Saint in to assess patient. New catheter 22 coude placed draining pink clear urine. 1900- Bedside and Verbal shift change report given to Elizabeth Galicia RN (oncoming nurse) by Bere Orosco RN and Mikayla Nolan RN  (offgoing nurse). Report included the following information SBAR, Intake/Output, MAR and Recent Results. Statement Selected

## 2022-09-12 NOTE — OB RN PATIENT PROFILE - FUNCTIONAL ASSESSMENT - BASIC MOBILITY 6.
4-calculated by average/Not able to assess (calculate score using [facility] averaging method)

## 2022-09-12 NOTE — OB PROVIDER DELIVERY SUMMARY - NSOBVTERISKREFER_OBGYN_ALL_OB
Subjective   Chief Complaint   Patient presents with   • Abdominal Pain     2-3 days   • Heartburn   • GI Problem   • Fatigue     Carol Preciado is a 42 y.o. female.     Patient Care Team:  Marielle Beauchamp MD as PCP - General (Family Medicine)  Andrew Caceres MD as Consulting Physician (Obstetrics and Gynecology)    She is coming in today due to abdominal pain, which started about 2-3 days ago.  She reports that the pain was originally in the middle upper abdomen area and she just started that this was her regular flareup of gastritis as she had issues with that in the past.  However today in the morning when she woke up she noted that the pain is pretty intense in the right lower abdomen and at times it goes to 8/10.  She cannot really pinpoint any triggers or alleviating factors.  She has some nausea and even vomited couple of days ago.  She denies any vomiting today.  She feels somehow bloated and even constipated, but when she goes she has loose bowel movement.  She denies any fever.  She denies any urinary frequency, urgency, or blood in urine.  She also reports having some fatigue and tiredness and low energy, however this has been going on for some time.  She denies any sick contacts.       The following portions of the patient's history were reviewed and updated as appropriate: allergies, current medications, past family history, past medical history, past social history, past surgical history and problem list.  Past Medical History:   Diagnosis Date   • ADHD    • Allergic    • Anxiety    • Depression    • Ectopic pregnancy    • GERD (gastroesophageal reflux disease)    • Headache      Past Surgical History:   Procedure Laterality Date   • CHOLECYSTECTOMY     • LAPAROTOMY SALPINGO OOPHORECTOMY Left 2006   • UPPER GASTROINTESTINAL ENDOSCOPY  09/03/2019     The patient has a family history of  Family History   Problem Relation Age of Onset   • Breast cancer Maternal Aunt    • Cancer Maternal Aunt    •  "Anxiety disorder Mother    • Stroke Mother    • Anxiety disorder Brother    • Hearing loss Maternal Grandmother      Social History     Socioeconomic History   • Marital status:      Spouse name: Not on file   • Number of children: Not on file   • Years of education: Not on file   • Highest education level: Not on file   Tobacco Use   • Smoking status: Current Every Day Smoker     Types: Cigarettes   • Smokeless tobacco: Never Used   Substance and Sexual Activity   • Alcohol use: Yes     Frequency: 4 or more times a week   • Drug use: No   • Sexual activity: Defer       Review of Systems   Constitutional: Negative for activity change, fatigue and fever.   Respiratory: Negative for cough, chest tightness, shortness of breath and wheezing.    Cardiovascular: Negative for chest pain, palpitations and leg swelling.   Gastrointestinal: Positive for abdominal pain, nausea, vomiting and GERD. Negative for abdominal distention, anal bleeding, blood in stool, constipation, diarrhea and indigestion.   Genitourinary: Positive for pelvic pain. Negative for difficulty urinating, dysuria, flank pain, frequency, hematuria, urgency, vaginal bleeding, vaginal discharge and vaginal pain.   Skin: Negative for color change, dry skin and rash.   Neurological: Negative for tremors and headache.     Visit Vitals  /62 (BP Location: Left arm, Patient Position: Sitting, Cuff Size: Adult)   Pulse 87   Temp 97.3 °F (36.3 °C) (Temporal)   Resp 16   Ht 172.7 cm (68\")   Wt 56.2 kg (124 lb)   SpO2 99%   BMI 18.85 kg/m²       Current Outpatient Medications:   •  amphetamine-dextroamphetamine (Adderall) 10 MG tablet, Take 1 tablet by mouth 3 (Three) Times a Day., Disp: 84 tablet, Rfl: 0  •  desvenlafaxine (PRISTIQ) 100 MG 24 hr tablet, Take  by mouth Daily., Disp: , Rfl:   •  famotidine (PEPCID) 20 MG tablet, TAKE 1 TABLET BY MOUTH AT NIGHT AS NEEDED FOR HEARTBURN, Disp: 90 tablet, Rfl: 0  •  pantoprazole (PROTONIX) 40 MG EC tablet, " TAKE 1 TABLET BY MOUTH DAILY, Disp: 90 tablet, Rfl: 0  •  polyethylene glycol (MIRALAX) packet, Take 17 g by mouth Daily., Disp: , Rfl:     Objective   Physical Exam  Vitals signs and nursing note reviewed.   Constitutional:       Appearance: Normal appearance. She is well-developed.   HENT:      Head: Normocephalic and atraumatic.   Eyes:      Conjunctiva/sclera: Conjunctivae normal.      Pupils: Pupils are equal, round, and reactive to light.   Neck:      Musculoskeletal: Normal range of motion and neck supple.   Cardiovascular:      Rate and Rhythm: Normal rate and regular rhythm.      Heart sounds: Normal heart sounds. No murmur. No gallop.    Pulmonary:      Effort: Pulmonary effort is normal. No respiratory distress.      Breath sounds: Normal breath sounds. No wheezing, rhonchi or rales.   Chest:      Chest wall: No tenderness.   Abdominal:      General: There is no distension.      Palpations: There is no mass.      Tenderness: There is abdominal tenderness. There is no right CVA tenderness, left CVA tenderness, guarding or rebound.      Hernia: No hernia is present.      Comments: There is some palpation tenderness noted in epigastric area and also in the right lower quadrant.   Musculoskeletal: Normal range of motion.         General: No swelling or deformity.   Skin:     General: Skin is warm and dry.   Neurological:      General: No focal deficit present.      Mental Status: She is alert and oriented to person, place, and time.                Assessment/Plan   Problems Addressed this Visit        Digestive    Gastroesophageal reflux disease without esophagitis       Endocrine    Cyst of right ovary       Nervous and Auditory    Abdominal pain - Primary    Relevant Orders    CBC Auto Differential    Comprehensive Metabolic Panel    Lipase    Urinalysis With Culture If Indicated - Urine, Clean Catch    CT Abdomen Pelvis With Contrast    HCG, B-subunit, Quantitative       Other    Other fatigue    Relevant  Orders    TSH        Her exam today shows pretty significant palpation tenderness in the epigastric area and right lower quadrant.  Stat CT of the abdomen and pelvis was done and the verbal report says that her appendix is intact.  She however has a right ovarian cyst with some free fluid in the pelvic area which might be pointing towards recently ruptured ovarian cyst.  I discussed the CT findings with the patient on the phone and she was advised to take some Tylenol over-the-counter to control the pain.  I advised for her not to take anti-inflammatories due to her ongoing acid reflux problems.  She will continue her PPI and famotidine.  Blood work and urinalysis are being done as well and the results are pending.  Further recommendations will follow once the results of the blood work are back.             Requested Prescriptions      No prescriptions requested or ordered in this encounter      Refer to the Assessment tab to view/cancel completed assessment.

## 2022-09-12 NOTE — OB RN PATIENT PROFILE - BREAST MILK PROVIDES PROTECTION AGAINST INFECTION
H&P reviewed  After examining the patient I find no changes in the patients condition since the H&P had been written  Vitals:    06/14/21 0553   BP: 167/92   Pulse: 88   Resp: 20   Temp:    SpO2: 91%   Procedure reviewed with the patient in detail in the holding unit  Risks were discussed and consent form signed  Laterality marked-left  Statement Selected

## 2022-09-12 NOTE — DISCHARGE NOTE OB - CARE PLAN
1 Principal Discharge DX:	Single delivery by  section  Assessment and plan of treatment:	post op care

## 2022-09-12 NOTE — OB PROVIDER H&P - ASSESSMENT
This is a 31 year old  at 40.4 weeks gestational age admitted for prolonged ROM ( )    Plan discussed with Dr Topete, Dr Sanders, Dr Gray anesthesia, CARLOS ken  Pt NPO at 1930  RVP sent stat to lab for expedited results  d/w Dr Sanders regarding prolong rupture, will give Azithromycin in OR  routine preop orders

## 2022-09-12 NOTE — OB PROVIDER TRIAGE NOTE - NSHPPHYSICALEXAM_GEN_ALL_CORE
Vital Signs Last 24 Hrs  T(C): 37.0 (12 Sep 2022 16:01), Max: 37 (12 Sep 2022 15:45)  T(F): 98.6 (12 Sep 2022 16:01), Max: 98.6 (12 Sep 2022 15:45)  HR: 81 (12 Sep 2022 16:44) (81 - 82)  BP: 118/73 (12 Sep 2022 16:44) (118/73 - 127/77)  BP(mean): --  RR: 16 (12 Sep 2022 15:45) (16 - 16)  SpO2: --    A&O x3  CTAB  abdomen: gravid, soft, nontender  sse: modeate amount of non odorous milky discharge, nitrazine equivical, fern negative  Amnisure sent  sve 0.5/20/-3  TAS: vtx, fundal placenta, bpp 8/8 julian 6.43  nst in progress Vital Signs Last 24 Hrs  T(C): 37.0 (12 Sep 2022 16:01), Max: 37 (12 Sep 2022 15:45)  T(F): 98.6 (12 Sep 2022 16:01), Max: 98.6 (12 Sep 2022 15:45)  HR: 77 (12 Sep 2022 17:25) (77 - 82)  BP: 119/71 (12 Sep 2022 17:25) (118/73 - 127/77)  BP(mean): --  RR: 16 (12 Sep 2022 15:45) (16 - 16)  SpO2: --    A&o x3  CTAB  abdomen: gravid, soft, nontender  TAS: vtx, fundal placenta, bpp 8/8, julian 6.43  SSE: cervix appears closed, moderate amount of milky watery non odorous fluid in vault, Nitrazine equivocal, fern negative  amnisure sent  sve 0.5/20/-3  NST: 135 baseline, moderate variability + accels, no decels, no contractions

## 2022-09-12 NOTE — OB PROVIDER TRIAGE NOTE - HISTORY OF PRESENT ILLNESS
This is a 31 year old  at 40.4 weeks gestational age presents from [facility] with  questionable + FFN that was sent in office on satursday 9/10. Pt was in [facility] nd evaluated in D&T on  or leaking of fluid and was found to have intact membranes with an VANE of 8.5. PT went to [facility] for follow up NST on 9/10 and reports that a swab was sent to check the fluid, vane at that time ws found to be 9 as per patient. Today pt reports continued leaking of watery milky discharge and was called by the office that the FFN done on 9/10 was positive.   Pt reports +GFM, reports continued leaking of milky watery discharge, denies VB or contractions  Pt scheduled for rpt c/s on , iititally desired TOLAC, however today shes she wants a c/s    AP course uncomplicated as per patient  most recent efw 8#9 today     med: anemia   surg: c/s 202  gyn: denies  OB: 8/10/2020 sched pLTCS 2/2 suspected macrosomia 8#12   current meds: pnv, iron   NKDA  This is a 31 year old  at 40.4 weeks gestational age presents from [facility] with  questionable + FFN that was sent in office on satursday 9/10. Pt was in [facility] nd evaluated in D&T on  or leaking of fluid and was found to have intact membranes with an VANE of 8.5. PT went to [facility] for follow up NST on 9/10 and reports that a swab was sent to check the fluid, vane at that time ws found to be 9 as per patient. Today pt reports continued leaking of watery milky discharge and was called by the office that the FFN done on 9/10 was positive.   Pt reports +GFM, reports continued leaking of milky watery discharge, denies VB or contractions  Pt scheduled for rpt c/s on , iititally desired TOLAC, however today shes she wants a c/s  GBS neg     AP course uncomplicated as per patient  most recent efw 8#9 today     med: anemia   surg: c/s 202  gyn: denies  OB: 8/10/2020 sched pLTCS 2/2 suspected macrosomia 8#12   current meds: pnv, iron   NKDA

## 2022-09-13 LAB
BASOPHILS # BLD AUTO: 0.04 K/UL — SIGNIFICANT CHANGE UP (ref 0–0.2)
BASOPHILS NFR BLD AUTO: 0.2 % — SIGNIFICANT CHANGE UP (ref 0–2)
COVID-19 SPIKE DOMAIN AB INTERP: POSITIVE
COVID-19 SPIKE DOMAIN ANTIBODY RESULT: >250 U/ML — HIGH
EOSINOPHIL # BLD AUTO: 0 K/UL — SIGNIFICANT CHANGE UP (ref 0–0.5)
EOSINOPHIL NFR BLD AUTO: 0 % — SIGNIFICANT CHANGE UP (ref 0–6)
HCT VFR BLD CALC: 35.2 % — SIGNIFICANT CHANGE UP (ref 34.5–45)
HGB BLD-MCNC: 12.5 G/DL — SIGNIFICANT CHANGE UP (ref 11.5–15.5)
IANC: 18.27 K/UL — HIGH (ref 1.8–7.4)
IMM GRANULOCYTES NFR BLD AUTO: 0.9 % — SIGNIFICANT CHANGE UP (ref 0–1.5)
LYMPHOCYTES # BLD AUTO: 1.29 K/UL — SIGNIFICANT CHANGE UP (ref 1–3.3)
LYMPHOCYTES # BLD AUTO: 6.3 % — LOW (ref 13–44)
MCHC RBC-ENTMCNC: 29.9 PG — SIGNIFICANT CHANGE UP (ref 27–34)
MCHC RBC-ENTMCNC: 35.5 GM/DL — SIGNIFICANT CHANGE UP (ref 32–36)
MCV RBC AUTO: 84.2 FL — SIGNIFICANT CHANGE UP (ref 80–100)
MONOCYTES # BLD AUTO: 0.81 K/UL — SIGNIFICANT CHANGE UP (ref 0–0.9)
MONOCYTES NFR BLD AUTO: 3.9 % — SIGNIFICANT CHANGE UP (ref 2–14)
NEUTROPHILS # BLD AUTO: 18.27 K/UL — HIGH (ref 1.8–7.4)
NEUTROPHILS NFR BLD AUTO: 88.7 % — HIGH (ref 43–77)
NRBC # BLD: 0 /100 WBCS — SIGNIFICANT CHANGE UP (ref 0–0)
NRBC # FLD: 0 K/UL — SIGNIFICANT CHANGE UP (ref 0–0)
PLATELET # BLD AUTO: 180 K/UL — SIGNIFICANT CHANGE UP (ref 150–400)
RBC # BLD: 4.18 M/UL — SIGNIFICANT CHANGE UP (ref 3.8–5.2)
RBC # FLD: 14.5 % — SIGNIFICANT CHANGE UP (ref 10.3–14.5)
SARS-COV-2 IGG+IGM SERPL QL IA: >250 U/ML — HIGH
SARS-COV-2 IGG+IGM SERPL QL IA: POSITIVE
T PALLIDUM AB TITR SER: NEGATIVE — SIGNIFICANT CHANGE UP
WBC # BLD: 20.59 K/UL — HIGH (ref 3.8–10.5)
WBC # FLD AUTO: 20.59 K/UL — HIGH (ref 3.8–10.5)

## 2022-09-13 RX ORDER — FERROUS SULFATE 325(65) MG
325 TABLET ORAL DAILY
Refills: 0 | Status: DISCONTINUED | OUTPATIENT
Start: 2022-09-13 | End: 2022-09-14

## 2022-09-13 RX ORDER — ACETAMINOPHEN 500 MG
3 TABLET ORAL
Qty: 30 | Refills: 0
Start: 2022-09-13

## 2022-09-13 RX ORDER — IBUPROFEN 200 MG
600 TABLET ORAL EVERY 6 HOURS
Refills: 0 | Status: DISCONTINUED | OUTPATIENT
Start: 2022-09-13 | End: 2022-09-14

## 2022-09-13 RX ORDER — SENNA PLUS 8.6 MG/1
1 TABLET ORAL
Refills: 0 | Status: DISCONTINUED | OUTPATIENT
Start: 2022-09-13 | End: 2022-09-14

## 2022-09-13 RX ORDER — HEPARIN SODIUM 5000 [USP'U]/ML
5000 INJECTION INTRAVENOUS; SUBCUTANEOUS EVERY 12 HOURS
Refills: 0 | Status: DISCONTINUED | OUTPATIENT
Start: 2022-09-13 | End: 2022-09-14

## 2022-09-13 RX ORDER — KETOROLAC TROMETHAMINE 30 MG/ML
30 SYRINGE (ML) INJECTION EVERY 6 HOURS
Refills: 0 | Status: DISCONTINUED | OUTPATIENT
Start: 2022-09-13 | End: 2022-09-13

## 2022-09-13 RX ADMIN — Medication 325 MILLIGRAM(S): at 12:50

## 2022-09-13 RX ADMIN — Medication 975 MILLIGRAM(S): at 15:50

## 2022-09-13 RX ADMIN — Medication 600 MILLIGRAM(S): at 23:30

## 2022-09-13 RX ADMIN — HEPARIN SODIUM 5000 UNIT(S): 5000 INJECTION INTRAVENOUS; SUBCUTANEOUS at 05:17

## 2022-09-13 RX ADMIN — Medication 975 MILLIGRAM(S): at 20:21

## 2022-09-13 RX ADMIN — Medication 975 MILLIGRAM(S): at 20:30

## 2022-09-13 RX ADMIN — Medication 30 MILLIGRAM(S): at 05:17

## 2022-09-13 RX ADMIN — Medication 600 MILLIGRAM(S): at 18:40

## 2022-09-13 RX ADMIN — Medication 30 MILLIGRAM(S): at 12:50

## 2022-09-13 RX ADMIN — Medication 975 MILLIGRAM(S): at 09:40

## 2022-09-13 RX ADMIN — Medication 600 MILLIGRAM(S): at 18:10

## 2022-09-13 RX ADMIN — Medication 600 MILLIGRAM(S): at 23:00

## 2022-09-13 RX ADMIN — Medication 30 MILLIGRAM(S): at 13:20

## 2022-09-13 RX ADMIN — Medication 30 MILLIGRAM(S): at 05:45

## 2022-09-13 RX ADMIN — Medication 975 MILLIGRAM(S): at 10:10

## 2022-09-13 RX ADMIN — Medication 975 MILLIGRAM(S): at 02:00

## 2022-09-13 RX ADMIN — HEPARIN SODIUM 5000 UNIT(S): 5000 INJECTION INTRAVENOUS; SUBCUTANEOUS at 18:11

## 2022-09-13 RX ADMIN — Medication 975 MILLIGRAM(S): at 02:30

## 2022-09-13 RX ADMIN — Medication 1 TABLET(S): at 12:50

## 2022-09-13 RX ADMIN — Medication 975 MILLIGRAM(S): at 16:20

## 2022-09-13 RX ADMIN — SENNA PLUS 1 TABLET(S): 8.6 TABLET ORAL at 18:10

## 2022-09-13 RX ADMIN — SENNA PLUS 1 TABLET(S): 8.6 TABLET ORAL at 05:17

## 2022-09-13 NOTE — PROGRESS NOTE ADULT - SUBJECTIVE AND OBJECTIVE BOX
Pain Service Follow-up  Postop Day  1    S/P  C- Section    T(C): 37 (09-13-22 @ 06:48), Max: 37 (09-12-22 @ 15:45)  HR: 92 (09-13-22 @ 09:57) (71 - 92)  BP: 114/72 (09-13-22 @ 09:57) (87/43 - 133/90)  RR: 18 (09-13-22 @ 09:57) (13 - 22)  SpO2: 97% (09-13-22 @ 09:57) (95% - 100%)  Wt(kg): --      THERAPY:  Spinal Morphine     Sedation Score:	  [X] Alert	      [  ] Drowsy       [  ] Arousable	[  ] Asleep         [  ] Unresponsive    Side Effects:	  [X] None	      [  ] Nausea       [  ] Pruritus        [  ] Weakness   [  ] Numbness        ASSESSMENT/ PLAN   [ X ] Discontinue         [  ] Continue    [ X ] Documentation and Verification of current medications       Satisfactory Post Anesthetic Course

## 2022-09-13 NOTE — PACU DISCHARGE NOTE - HYDRATION STATUS:
Telephone Encounter by Jazlyn Montes MD at 04/27/17 08:24 AM     Author:  Jazlyn Montes MD Service:  (none) Author Type:  Physician     Filed:  04/27/17 08:25 AM Encounter Date:  4/25/2017 Status:  Signed     :  Jazlyn Montes MD (Physician)            ok to issue  a refill on this rx with same quantity, same instructions with[AG1.1T] 0[AG1.1M] refills[AG1.1T]        Revision History        User Key Date/Time User Provider Type Action    > AG1.1 04/27/17 08:25 AM Jazlyn Montes MD Physician Sign    M - Manual, T - Template            
Telephone Encounter by Leidy Lee MA at 04/26/17 06:15 AM     Author:  Leidy Lee MA Service:  (none) Author Type:  Medical Assistant     Filed:  04/26/17 06:16 AM Encounter Date:  4/25/2017 Status:  Signed     :  Leidy Lee MA (Medical Assistant)            Fwd to dr Rucker upon her return[AZ1.1M]       Below refill request requires your intervention because:[AZ1.1T]   · No protocol for medication available[AZ1.1M]  albuterol (PROVENTIL) (2.5 MG/3ML) 0.083% nebulizer solution     --   Sig - Route: Inhale 2.5 mg by mouth every 6 (six) hours as needed for Wheezing. - Inhalation        Class: Historical Med[AZ1.1C]          Please advise[AZ1.1M]            Revision History        User Key Date/Time User Provider Type Action    > AZ1.1 04/26/17 06:16 AM Leidy Lee MA Medical Assistant Sign    C - Copied, M - Manual, T - Template            
Telephone Encounter by Lillian Pena NCMA at 04/27/17 12:40 PM     Author:  Lillian Pena NCMA Service:  (none) Author Type:  Certified Medical Assistant     Filed:  04/27/17 12:40 PM Encounter Date:  4/25/2017 Status:  Signed     :  Lillian Pena NCMA (Certified Medical Assistant)            Prescription has been electronically faxed to your pharmacy.[TR1.1T]    Refilled per Dr. Fernandez's request.[TR1.1M]      Revision History        User Key Date/Time User Provider Type Action    > TR1.1 04/27/17 12:40 PM Lillian Pena NCMA Certified Medical Assistant Sign    M - Manual, T - Template            
Satisfactory

## 2022-09-13 NOTE — PROGRESS NOTE ADULT - SUBJECTIVE AND OBJECTIVE BOX
Patient seen at bedside resting comfortably offers no new complaints. + Ambulation, + void without difficulty, + flatus;  no bm; tolerating regular diet. both breastfeeding and bottle feeding. Bonding well with baby. Denies HA, blurry vision or epigastric pain, CP, SOB, N/V/D,  dizziness, palpitations, worsening vaginal bleeding.    Vital Signs Last 24 Hrs  T(C): 37 (13 Sep 2022 06:48), Max: 37 (12 Sep 2022 15:45)  T(F): 98.6 (13 Sep 2022 06:48), Max: 98.6 (12 Sep 2022 15:45)  HR: 92 (13 Sep 2022 09:57) (71 - 92)  BP: 114/72 (13 Sep 2022 09:57) (87/43 - 133/90)  BP(mean): 72 (13 Sep 2022 01:00) (51 - 115)  RR: 18 (13 Sep 2022 09:57) (13 - 22)  SpO2: 97% (13 Sep 2022 09:57) (95% - 100%)        Gen: A&O x 3, NAD  Chest: CTABL  Cardiac: S1, S2, RRR  Breast: Soft, nontender, nonengorged  Abdomen: +BS; soft; Nontender, nondistended, Incision C/D/I steri strips in place, Dressing remains intact  Gyn: Minimal lochia  Extremities: Nontender, DTRS 2+, no worsening edema                          12.5   20.59 )-----------( 180      ( 13 Sep 2022 06:00 )             35.2       A/P: POD #0 s/p c/s   -Pain management as needed  -cont post op care  -OOB and ambulate  - reviewed CBC   -f/u am CBC  -encourage insentive spirometer use  -encouraged hydration   -Encouraged breastfeeding   -d/w dr Josue HERRERAP/ Nelli FIGUEROAP

## 2022-09-14 VITALS
OXYGEN SATURATION: 99 % | TEMPERATURE: 98 F | DIASTOLIC BLOOD PRESSURE: 80 MMHG | RESPIRATION RATE: 18 BRPM | SYSTOLIC BLOOD PRESSURE: 127 MMHG | HEART RATE: 90 BPM

## 2022-09-14 LAB
BASOPHILS # BLD AUTO: 0.03 K/UL — SIGNIFICANT CHANGE UP (ref 0–0.2)
BASOPHILS NFR BLD AUTO: 0.3 % — SIGNIFICANT CHANGE UP (ref 0–2)
EOSINOPHIL # BLD AUTO: 0.05 K/UL — SIGNIFICANT CHANGE UP (ref 0–0.5)
EOSINOPHIL NFR BLD AUTO: 0.5 % — SIGNIFICANT CHANGE UP (ref 0–6)
HCT VFR BLD CALC: 34.6 % — SIGNIFICANT CHANGE UP (ref 34.5–45)
HGB BLD-MCNC: 11.3 G/DL — LOW (ref 11.5–15.5)
IANC: 7.77 K/UL — HIGH (ref 1.8–7.4)
IMM GRANULOCYTES NFR BLD AUTO: 0.6 % — SIGNIFICANT CHANGE UP (ref 0–1.5)
LYMPHOCYTES # BLD AUTO: 2.23 K/UL — SIGNIFICANT CHANGE UP (ref 1–3.3)
LYMPHOCYTES # BLD AUTO: 20.5 % — SIGNIFICANT CHANGE UP (ref 13–44)
MCHC RBC-ENTMCNC: 28.9 PG — SIGNIFICANT CHANGE UP (ref 27–34)
MCHC RBC-ENTMCNC: 32.7 GM/DL — SIGNIFICANT CHANGE UP (ref 32–36)
MCV RBC AUTO: 88.5 FL — SIGNIFICANT CHANGE UP (ref 80–100)
MONOCYTES # BLD AUTO: 0.71 K/UL — SIGNIFICANT CHANGE UP (ref 0–0.9)
MONOCYTES NFR BLD AUTO: 6.5 % — SIGNIFICANT CHANGE UP (ref 2–14)
NEUTROPHILS # BLD AUTO: 7.77 K/UL — HIGH (ref 1.8–7.4)
NEUTROPHILS NFR BLD AUTO: 71.6 % — SIGNIFICANT CHANGE UP (ref 43–77)
NRBC # BLD: 0 /100 WBCS — SIGNIFICANT CHANGE UP (ref 0–0)
NRBC # FLD: 0 K/UL — SIGNIFICANT CHANGE UP (ref 0–0)
PLATELET # BLD AUTO: 184 K/UL — SIGNIFICANT CHANGE UP (ref 150–400)
RBC # BLD: 3.91 M/UL — SIGNIFICANT CHANGE UP (ref 3.8–5.2)
RBC # FLD: 14.8 % — HIGH (ref 10.3–14.5)
WBC # BLD: 10.86 K/UL — HIGH (ref 3.8–10.5)
WBC # FLD AUTO: 10.86 K/UL — HIGH (ref 3.8–10.5)

## 2022-09-14 RX ADMIN — SENNA PLUS 1 TABLET(S): 8.6 TABLET ORAL at 05:28

## 2022-09-14 RX ADMIN — Medication 600 MILLIGRAM(S): at 05:54

## 2022-09-14 RX ADMIN — Medication 975 MILLIGRAM(S): at 09:08

## 2022-09-14 RX ADMIN — Medication 975 MILLIGRAM(S): at 02:30

## 2022-09-14 RX ADMIN — Medication 975 MILLIGRAM(S): at 02:04

## 2022-09-14 RX ADMIN — Medication 600 MILLIGRAM(S): at 14:02

## 2022-09-14 RX ADMIN — HEPARIN SODIUM 5000 UNIT(S): 5000 INJECTION INTRAVENOUS; SUBCUTANEOUS at 05:28

## 2022-09-14 RX ADMIN — Medication 975 MILLIGRAM(S): at 08:38

## 2022-09-14 RX ADMIN — Medication 600 MILLIGRAM(S): at 05:28

## 2022-09-14 NOTE — PROGRESS NOTE ADULT - SUBJECTIVE AND OBJECTIVE BOX
LATE ENTRY NOTE SECONDARY TO CENSUS      Post-Operative Note, C/S  She is a  31y woman who is now post-operative day: 2    Subjective:  The patient feels well.  She is ambulating.   She is tolerating regular diet.  She denies nausea and vomiting; denies fever.  She is voiding.  Her pain is controlled; incisional pain is appropriate.  She reports normal postpartum bleeding.  She is breastfeeding.  She is formula feeding.    Physical exam:    Vital Signs Last 24 Hrs  T(C): 36.5 (14 Sep 2022 13:35), Max: 37.1 (14 Sep 2022 10:02)  T(F): 97.7 (14 Sep 2022 13:35), Max: 98.8 (14 Sep 2022 10:02)  HR: 90 (14 Sep 2022 13:35) (61 - 90)  BP: 127/80 (14 Sep 2022 13:35) (110/60 - 129/74)  BP(mean): --  RR: 18 (14 Sep 2022 13:35) (16 - 18)  SpO2: 99% (14 Sep 2022 13:35) (95% - 100%)    Parameters below as of 14 Sep 2022 13:35  Patient On (Oxygen Delivery Method): room air        Gen: NAD  Breast: Soft, nontender, not engorged.  Abdomen: Soft, nontender, no distension , firm uterine fundus at umbilicus.  Incision: C/D/I.  Pelvic: Normal lochia noted  Ext: No calf tenderness    LABS:                        11.3   10.86 )-----------( 184      ( 14 Sep 2022 06:26 )             34.6       Rubella status:     Allergies    No Known Allergies    Intolerances      MEDICATIONS  (STANDING):  acetaminophen     Tablet .. 975 milliGRAM(s) Oral <User Schedule>  diphtheria/tetanus/pertussis (acellular) Vaccine (ADAcel) 0.5 milliLiter(s) IntraMuscular once  ferrous    sulfate 325 milliGRAM(s) Oral daily  heparin   Injectable 5000 Unit(s) SubCutaneous every 12 hours  ibuprofen  Tablet. 600 milliGRAM(s) Oral every 6 hours  prenatal multivitamin 1 Tablet(s) Oral daily  senna 1 Tablet(s) Oral two times a day    MEDICATIONS  (PRN):  diphenhydrAMINE 25 milliGRAM(s) Oral every 6 hours PRN Pruritus  lanolin Ointment 1 Application(s) Topical every 6 hours PRN Sore Nipples  magnesium hydroxide Suspension 30 milliLiter(s) Oral two times a day PRN Constipation  oxyCODONE    IR 5 milliGRAM(s) Oral every 3 hours PRN Moderate to Severe Pain (4-10)  oxyCODONE    IR 5 milliGRAM(s) Oral once PRN Moderate to Severe Pain (4-10)  simethicone 80 milliGRAM(s) Chew every 4 hours PRN Gas

## 2022-09-14 NOTE — PROGRESS NOTE ADULT - ASSESSMENT
Assessment and Plan  POD # 2 s/p R/C/S. Incision bandage removed today.     Her pain is well controlled.   She is tolerating a regular diet and passing flatus.   Denies N/V. Denies CP/SOB/lightheadedness/dizziness.   She is ambulating without difficulty.   Voiding spontaneously.    Patient is stable and doing well post-operatively.    - Continue regular diet.  - Increase ambulation.  - Continue motrin, tylenol, oxycodone PRN for pain control.   -Encourage breastfeeding.  -Incisional care and PO instructions reviewed.     Follow up @ [facility] in 2 weeks for incision check visit  [phone]    Discussed with MD Lakshmi Amado

## 2023-03-23 ENCOUNTER — TRANSCRIPTION ENCOUNTER (OUTPATIENT)
Age: 33
End: 2023-03-23

## 2023-04-28 NOTE — OB RN PATIENT PROFILE - FALL HARM RISK CONCLUSION
RN noted ongoing bleeding  I evaluated at the patient again at the bedside    She appears well  She denies lightheadedness, dizziness and fatigue  She reports feeling well    Evacuated 115mL of clot  Administered Methergin  Dr. Jerry Soriano updated    Will administer zosyn   Will get 6hr post op H&H    Will place CHARLES device if bleeding continues    Total EBL:1323mL Universal Safety Interventions

## 2024-04-21 ENCOUNTER — NON-APPOINTMENT (OUTPATIENT)
Age: 34
End: 2024-04-21
